# Patient Record
Sex: FEMALE | Race: WHITE | Employment: OTHER | URBAN - METROPOLITAN AREA
[De-identification: names, ages, dates, MRNs, and addresses within clinical notes are randomized per-mention and may not be internally consistent; named-entity substitution may affect disease eponyms.]

---

## 2018-05-06 ENCOUNTER — HOSPITAL ENCOUNTER (OUTPATIENT)
Age: 58
Discharge: HOME | End: 2018-05-06
Attending: FAMILY MEDICINE
Payer: COMMERCIAL

## 2018-05-06 VITALS
SYSTOLIC BLOOD PRESSURE: 117 MMHG | RESPIRATION RATE: 18 BRPM | TEMPERATURE: 98 F | WEIGHT: 160 LBS | DIASTOLIC BLOOD PRESSURE: 90 MMHG | OXYGEN SATURATION: 100 % | BODY MASS INDEX: 24 KG/M2 | HEART RATE: 82 BPM

## 2018-05-06 DIAGNOSIS — N30.01 ACUTE CYSTITIS WITH HEMATURIA: Primary | ICD-10-CM

## 2018-05-06 PROCEDURE — 87147 CULTURE TYPE IMMUNOLOGIC: CPT | Performed by: FAMILY MEDICINE

## 2018-05-06 PROCEDURE — 99214 OFFICE O/P EST MOD 30 MIN: CPT

## 2018-05-06 PROCEDURE — 81002 URINALYSIS NONAUTO W/O SCOPE: CPT | Performed by: FAMILY MEDICINE

## 2018-05-06 PROCEDURE — 87086 URINE CULTURE/COLONY COUNT: CPT | Performed by: FAMILY MEDICINE

## 2018-05-06 RX ORDER — DIPHENHYDRAMINE HCL 25 MG
25 TABLET ORAL EVERY 6 HOURS PRN
COMMUNITY
End: 2019-02-12

## 2018-05-06 RX ORDER — SULFAMETHOXAZOLE AND TRIMETHOPRIM 800; 160 MG/1; MG/1
1 TABLET ORAL 2 TIMES DAILY
Qty: 6 TABLET | Refills: 0 | Status: SHIPPED | OUTPATIENT
Start: 2018-05-06 | End: 2018-05-09

## 2018-05-06 NOTE — ED PROVIDER NOTES
Patient Seen in: [facility]    History   Patient presents with:  Urinary Symptoms (urologic)    Stated Complaint: possible UTI X3 days    HPI    x 3  days of urinary frequency and urinary urgency with suprapubic pressure. Temporal [05/06/18 1240]  SpO2: 100 % [05/06/18 1238]  O2 Device: None (Room air) [05/06/18 1238]    Current:/90   Pulse 82   Temp (!) 97.5 °F (36.4 °C) (Temporal)   Resp 18   Wt 72.6 kg   SpO2 100%   BMI 23.63 kg/m²         Physical Exam    Gen: NAD

## 2018-05-06 NOTE — ED INITIAL ASSESSMENT (HPI)
Pt c/o urinary s/s x 3 days including pressure to bladder on urination, frequency and urgency. Denies back /flank pain, no nausea, no vomiting, no hematuria.

## 2018-05-10 PROCEDURE — 87086 URINE CULTURE/COLONY COUNT: CPT | Performed by: FAMILY MEDICINE

## 2018-06-13 ENCOUNTER — HOSPITAL ENCOUNTER (EMERGENCY)
Facility: HOSPITAL | Age: 58
Discharge: HOME | End: 2018-06-13
Attending: EMERGENCY MEDICINE
Payer: COMMERCIAL

## 2018-06-13 ENCOUNTER — APPOINTMENT (OUTPATIENT)
Dept: CT IMAGING | Facility: HOSPITAL | Age: 58
End: 2018-06-13
Attending: NURSE PRACTITIONER
Payer: COMMERCIAL

## 2018-06-13 VITALS
TEMPERATURE: 98 F | SYSTOLIC BLOOD PRESSURE: 138 MMHG | HEART RATE: 74 BPM | RESPIRATION RATE: 18 BRPM | HEIGHT: 69 IN | BODY MASS INDEX: 23.7 KG/M2 | DIASTOLIC BLOOD PRESSURE: 74 MMHG | OXYGEN SATURATION: 100 % | WEIGHT: 160 LBS

## 2018-06-13 DIAGNOSIS — S16.1XXA STRAIN OF NECK MUSCLE, INITIAL ENCOUNTER: Primary | ICD-10-CM

## 2018-06-13 PROCEDURE — 96372 THER/PROPH/DIAG INJ SC/IM: CPT

## 2018-06-13 PROCEDURE — 72125 CT NECK SPINE W/O DYE: CPT | Performed by: NURSE PRACTITIONER

## 2018-06-13 PROCEDURE — 99284 EMERGENCY DEPT VISIT MOD MDM: CPT

## 2018-06-13 RX ORDER — CYCLOBENZAPRINE HCL 10 MG
10 TABLET ORAL 3 TIMES DAILY PRN
Qty: 20 TABLET | Refills: 0 | Status: SHIPPED | OUTPATIENT
Start: 2018-06-13 | End: 2018-06-20

## 2018-06-13 RX ORDER — MORPHINE SULFATE 4 MG/ML
4 INJECTION, SOLUTION INTRAMUSCULAR; INTRAVENOUS ONCE
Status: COMPLETED | OUTPATIENT
Start: 2018-06-13 | End: 2018-06-13

## 2018-06-13 RX ORDER — DIAZEPAM 5 MG/1
5 TABLET ORAL ONCE
Status: COMPLETED | OUTPATIENT
Start: 2018-06-13 | End: 2018-06-13
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
## 2019-09-03 ENCOUNTER — HOSPITAL ENCOUNTER (OUTPATIENT)
Facility: HOSPITAL | Age: 59
Setting detail: OBSERVATION
Discharge: HOME | End: 2019-09-04
Attending: EMERGENCY MEDICINE | Admitting: HOSPITALIST
Payer: COMMERCIAL

## 2019-09-03 ENCOUNTER — APPOINTMENT (OUTPATIENT)
Dept: CT IMAGING | Facility: HOSPITAL | Age: 59
End: 2019-09-03
Attending: EMERGENCY MEDICINE
Payer: COMMERCIAL

## 2019-09-03 DIAGNOSIS — R29.898 LEFT ARM WEAKNESS: Primary | ICD-10-CM

## 2019-09-03 LAB
ALBUMIN SERPL-MCNC: 3.8 G/DL (ref 3.4–5)
ALBUMIN/GLOB SERPL: 1.2 {RATIO} (ref 1–2)
ALP LIVER SERPL-CCNC: 123 U/L (ref 46–118)
ALT SERPL-CCNC: 24 U/L (ref 13–56)
ANION GAP SERPL CALC-SCNC: 5 MMOL/L (ref 0–18)
APTT PPP: 26.6 SECONDS (ref 25.4–36.1)
AST SERPL-CCNC: 24 U/L (ref 15–37)
ATRIAL RATE: 65 BPM
BASOPHILS # BLD AUTO: 0.02 X10(3) UL (ref 0–0.2)
BASOPHILS NFR BLD AUTO: 0.4 %
BILIRUB SERPL-MCNC: 0.2 MG/DL (ref 0.1–2)
BUN BLD-MCNC: 12 MG/DL (ref 7–18)
BUN/CREAT SERPL: 15.8 (ref 10–20)
CALCIUM BLD-MCNC: 9.1 MG/DL (ref 8.5–10.1)
CHLORIDE SERPL-SCNC: 104 MMOL/L (ref 98–112)
CO2 SERPL-SCNC: 31 MMOL/L (ref 21–32)
CREAT BLD-MCNC: 0.76 MG/DL (ref 0.55–1.02)
DEPRECATED RDW RBC AUTO: 45.1 FL (ref 35.1–46.3)
EOSINOPHIL # BLD AUTO: 0.49 X10(3) UL (ref 0–0.7)
EOSINOPHIL NFR BLD AUTO: 9.6 %
ERYTHROCYTE [DISTWIDTH] IN BLOOD BY AUTOMATED COUNT: 12.6 % (ref 11–15)
GLOBULIN PLAS-MCNC: 3.2 G/DL (ref 2.8–4.4)
GLUCOSE BLD-MCNC: 91 MG/DL (ref 70–99)
HCT VFR BLD AUTO: 38.1 % (ref 35–48)
HGB BLD-MCNC: 12.3 G/DL (ref 12–16)
IMM GRANULOCYTES # BLD AUTO: 0.01 X10(3) UL (ref 0–1)
IMM GRANULOCYTES NFR BLD: 0.2 %
INR BLD: 0.94 (ref 0.9–1.1)
LYMPHOCYTES # BLD AUTO: 1.51 X10(3) UL (ref 1–4)
LYMPHOCYTES NFR BLD AUTO: 29.5 %
M PROTEIN MFR SERPL ELPH: 7 G/DL (ref 6.4–8.2)
MCH RBC QN AUTO: 31 PG (ref 26–34)
MCHC RBC AUTO-ENTMCNC: 32.3 G/DL (ref 31–37)
MCV RBC AUTO: 96 FL (ref 80–100)
MONOCYTES # BLD AUTO: 0.66 X10(3) UL (ref 0.1–1)
MONOCYTES NFR BLD AUTO: 12.9 %
NEUTROPHILS # BLD AUTO: 2.43 X10 (3) UL (ref 1.5–7.7)
NEUTROPHILS # BLD AUTO: 2.43 X10(3) UL (ref 1.5–7.7)
NEUTROPHILS NFR BLD AUTO: 47.4 %
OSMOLALITY SERPL CALC.SUM OF ELEC: 289 MOSM/KG (ref 275–295)
P AXIS: 37 DEGREES
P-R INTERVAL: 134 MS
PLATELET # BLD AUTO: 220 10(3)UL (ref 150–450)
POTASSIUM SERPL-SCNC: 3.7 MMOL/L (ref 3.5–5.1)
PSA SERPL DL<=0.01 NG/ML-MCNC: 12.9 SECONDS (ref 12.5–14.7)
Q-T INTERVAL: 432 MS
QRS DURATION: 80 MS
QTC CALCULATION (BEZET): 449 MS
R AXIS: 47 DEGREES
RBC # BLD AUTO: 3.97 X10(6)UL (ref 3.8–5.3)
SODIUM SERPL-SCNC: 140 MMOL/L (ref 136–145)
T AXIS: 62 DEGREES
VENTRICULAR RATE: 65 BPM
WBC # BLD AUTO: 5.1 X10(3) UL (ref 4–11)

## 2019-09-03 PROCEDURE — 70450 CT HEAD/BRAIN W/O DYE: CPT | Performed by: EMERGENCY MEDICINE

## 2019-09-03 PROCEDURE — 99243 OFF/OP CNSLTJ NEW/EST LOW 30: CPT | Performed by: OTHER

## 2019-09-03 RX ORDER — DOCUSATE SODIUM 100 MG/1
100 CAPSULE, LIQUID FILLED ORAL 2 TIMES DAILY
Status: DISCONTINUED | OUTPATIENT
Start: 2019-09-03 | End: 2019-09-04

## 2019-09-03 RX ORDER — ACETAMINOPHEN 325 MG/1
650 TABLET ORAL EVERY 6 HOURS PRN
Status: DISCONTINUED | OUTPATIENT
Start: 2019-09-03 | End: 2019-09-04

## 2019-09-03 RX ORDER — BISACODYL 10 MG
10 SUPPOSITORY, RECTAL RECTAL
Status: DISCONTINUED | OUTPATIENT
Start: 2019-09-03 | End: 2019-09-04

## 2019-09-03 RX ORDER — DULOXETIN HYDROCHLORIDE 30 MG/1
30 CAPSULE, DELAYED RELEASE ORAL DAILY
COMMUNITY

## 2019-09-03 RX ORDER — CLONIDINE HYDROCHLORIDE 0.1 MG/1
0.1 TABLET ORAL 3 TIMES DAILY PRN
COMMUNITY
Start: 2019-08-28 | End: 2020-09-24 | Stop reason: ALTCHOICE

## 2019-09-03 RX ORDER — POLYETHYLENE GLYCOL 3350 17 G/17G
17 POWDER, FOR SOLUTION ORAL DAILY PRN
Status: DISCONTINUED | OUTPATIENT
Start: 2019-09-03 | End: 2019-09-04

## 2019-09-03 RX ORDER — OXYCODONE HYDROCHLORIDE 15 MG/1
15 TABLET ORAL 4 TIMES DAILY PRN
Status: DISCONTINUED | OUTPATIENT
Start: 2019-09-03 | End: 2019-09-04

## 2019-09-03 RX ORDER — OXYCODONE HYDROCHLORIDE 15 MG/1
15 TABLET ORAL 4 TIMES DAILY PRN
COMMUNITY
Start: 2019-08-30 | End: 2019-10-02

## 2019-09-03 RX ORDER — SODIUM PHOSPHATE, DIBASIC AND SODIUM PHOSPHATE, MONOBASIC 7; 19 G/133ML; G/133ML
1 ENEMA RECTAL ONCE AS NEEDED
Status: DISCONTINUED | OUTPATIENT
Start: 2019-09-03 | End: 2019-09-04

## 2019-09-03 RX ORDER — TRAZODONE HYDROCHLORIDE 100 MG/1
100 TABLET ORAL NIGHTLY PRN
Status: DISCONTINUED | OUTPATIENT
Start: 2019-09-03 | End: 2019-09-04

## 2019-09-03 RX ORDER — DULOXETIN HYDROCHLORIDE 60 MG/1
60 CAPSULE, DELAYED RELEASE ORAL DAILY
COMMUNITY

## 2019-09-03 NOTE — ED PROVIDER NOTES
Patient Seen in: [facility] Emergency Department    History   Patient presents with:  Numbness Weakness (neurologic)    Stated Complaint: weakness to left arm and hand since yesterday morning    HPI    Patient is a 73-year-old female who presents fo occasional (not with pain meds)    Drug use: No             Review of Systems    Positive for stated complaint: weakness to left arm and hand since yesterday morning  Other systems are as noted in HPI. Constitutional and vital signs reviewed.       All oth Normal   PTT, ACTIVATED - Normal   CBC WITH DIFFERENTIAL WITH PLATELET    Narrative: The following orders were created for panel order CBC WITH DIFFERENTIAL WITH PLATELET.   Procedure                               Abnormality         Status PM.  CT is unremarkable. Symptoms are unchanged. Patient agrees with plan for admission for further neuro evaluation.       Disposition and Plan     Clinical Impression:  Left arm weakness  (primary encounter diagnosis)    Disposition:  Admit  9/3/2019 12

## 2019-09-03 NOTE — CONSULTS
Neurology H&P    Gamaliel Polo Patient Status:  Observation    3/7/1960 MRN [id number]   [facility] 3NE-A Attending Gilford Guy,*   Hosp Day # 0 PCP Rickey Bowen MD     Subjective:  Gamaliel Polo is a(n) 64year old femal CHOLECYSTECTOMY N/A 3/14/2016    Performed by Savita Osorio MD at [facility] MAIN OR   •      • OTHER SURGICAL HISTORY  2006    cardioseal   • PFO CLOSURE - EXTERNAL REFERRAL     • REMOVAL GALLBLADDER         SocHx:  Social History    Tobacco Use      Smoking normal, dry  Extremities: No clubbing or cyanosis      Neurologic:   MENTAL STATUS: alert, ox3, normal attention, language and fund of knowledge.       CRANIAL NERVES II to XII: PERRLA, no ptosis or diplopia, EOM intact, facial sensation intact, strong eye symptoms limited to strength in the L hand.  Reports an allergy to ASA and she has no vascular risk factors so will hold antiplatelet for now      Plan:  1. L hand weakness  - Possibly small stroke vs cervical radculopathy  - MRI brain pending  - Can consid

## 2019-09-03 NOTE — PLAN OF CARE
NURSING ADMISSION NOTE      Patient admitted via Cart  Oriented to room. Safety precautions initiated. Bed in low position. Call light in reach. Assumed patient care at 1630. VSS. Room air. Tele, NSR.  A&Ox4. Denies pain.    NIH 0.   L arm weaknes

## 2019-09-03 NOTE — ED INITIAL ASSESSMENT (HPI)
Patient woke up on Monday and noted that she had a decrease in her coordination in her left hand/wrist with some numbness to the left thumb

## 2019-09-04 ENCOUNTER — APPOINTMENT (OUTPATIENT)
Dept: MRI IMAGING | Facility: HOSPITAL | Age: 59
End: 2019-09-04
Attending: Other
Payer: COMMERCIAL

## 2019-09-04 VITALS
BODY MASS INDEX: 23.94 KG/M2 | SYSTOLIC BLOOD PRESSURE: 113 MMHG | WEIGHT: 161.63 LBS | RESPIRATION RATE: 18 BRPM | HEART RATE: 71 BPM | TEMPERATURE: 98 F | OXYGEN SATURATION: 98 % | HEIGHT: 69 IN | DIASTOLIC BLOOD PRESSURE: 65 MMHG

## 2019-09-04 LAB
ANION GAP SERPL CALC-SCNC: 4 MMOL/L (ref 0–18)
BASOPHILS # BLD AUTO: 0.03 X10(3) UL (ref 0–0.2)
BASOPHILS NFR BLD AUTO: 0.7 %
BUN BLD-MCNC: 9 MG/DL (ref 7–18)
BUN/CREAT SERPL: 11.3 (ref 10–20)
CALCIUM BLD-MCNC: 8.9 MG/DL (ref 8.5–10.1)
CHLORIDE SERPL-SCNC: 104 MMOL/L (ref 98–112)
CO2 SERPL-SCNC: 34 MMOL/L (ref 21–32)
CREAT BLD-MCNC: 0.8 MG/DL (ref 0.55–1.02)
DEPRECATED RDW RBC AUTO: 45.5 FL (ref 35.1–46.3)
EOSINOPHIL # BLD AUTO: 0.49 X10(3) UL (ref 0–0.7)
EOSINOPHIL NFR BLD AUTO: 11.8 %
ERYTHROCYTE [DISTWIDTH] IN BLOOD BY AUTOMATED COUNT: 12.8 % (ref 11–15)
GLUCOSE BLD-MCNC: 95 MG/DL (ref 70–99)
HCT VFR BLD AUTO: 37.5 % (ref 35–48)
HGB BLD-MCNC: 11.9 G/DL (ref 12–16)
IMM GRANULOCYTES # BLD AUTO: 0.01 X10(3) UL (ref 0–1)
IMM GRANULOCYTES NFR BLD: 0.2 %
LYMPHOCYTES # BLD AUTO: 1.38 X10(3) UL (ref 1–4)
LYMPHOCYTES NFR BLD AUTO: 33.2 %
MCH RBC QN AUTO: 30.7 PG (ref 26–34)
MCHC RBC AUTO-ENTMCNC: 31.7 G/DL (ref 31–37)
MCV RBC AUTO: 96.6 FL (ref 80–100)
MONOCYTES # BLD AUTO: 0.46 X10(3) UL (ref 0.1–1)
MONOCYTES NFR BLD AUTO: 11.1 %
NEUTROPHILS # BLD AUTO: 1.79 X10 (3) UL (ref 1.5–7.7)
NEUTROPHILS # BLD AUTO: 1.79 X10(3) UL (ref 1.5–7.7)
NEUTROPHILS NFR BLD AUTO: 43 %
OSMOLALITY SERPL CALC.SUM OF ELEC: 292 MOSM/KG (ref 275–295)
PLATELET # BLD AUTO: 207 10(3)UL (ref 150–450)
POTASSIUM SERPL-SCNC: 3.7 MMOL/L (ref 3.5–5.1)
RBC # BLD AUTO: 3.88 X10(6)UL (ref 3.8–5.3)
SODIUM SERPL-SCNC: 142 MMOL/L (ref 136–145)
WBC # BLD AUTO: 4.2 X10(3) UL (ref 4–11)

## 2019-09-04 PROCEDURE — 70551 MRI BRAIN STEM W/O DYE: CPT | Performed by: OTHER

## 2019-09-04 PROCEDURE — 99226 SUBSEQUENT OBSERVATION CARE: CPT | Performed by: OTHER

## 2019-09-04 RX ORDER — POTASSIUM CHLORIDE 20 MEQ/1
40 TABLET, EXTENDED RELEASE ORAL ONCE
Status: COMPLETED | OUTPATIENT
Start: 2019-09-04 | End: 2019-09-04

## 2019-09-04 NOTE — PROGRESS NOTES
[facility] Hospitalist Progress Note                                                                   [address] [facility]  3/7/1960    SUBJECTIVE:  No new or worsening neurological sx.   L hand strength

## 2019-09-04 NOTE — PLAN OF CARE
Pt is A&0x4 Room Air  NSR- on Tele  Family at the bedside  Meds given per Mar  PRN pain med given at this time  Safety precaution maintained  MRI tonight or tomorrow  Will continue to monitor    Problem: MUSCULOSKELETAL - ADULT  Goal: Return mobility to [facility]

## 2019-09-04 NOTE — H&P
[facility] Hospitalist History and Physical      Patient presents with:  Numbness Weakness (neurologic)       PCP: Divina Cobos MD      History of Present Illness: Patient is a 64year old female with PMH sig for ho myofacial pain syndrome, ho PFO presents w Alcohol/week: 0.0 standard drinks      Comment: occasional (not with pain meds)       Fam Hx  Family History   Problem Relation Age of Onset   • Musculo-skelatal Disorder Father    • Arthritis Father         ERICKA   • Gastro-Intestinal Disorder Mother 09/04/2019    ALB 3.8 09/03/2019    ALKPHO 123 09/03/2019    BILT 0.2 09/03/2019    TP 7.0 09/03/2019    AST 24 09/03/2019    ALT 24 09/03/2019    PTT 26.6 09/03/2019    INR 0.94 09/03/2019    PTP 12.9 09/03/2019       CXR: image personally reviewed. infusion. PATIENT STATED HISTORY: (As transcribed by Technologist)  Left hand weakness. FINDINGS:  The ventricles and sulci are within normal limits. There is no midline shift or mass effect. The basal cisterns are patent.    There is no acute intracr

## 2019-09-04 NOTE — PLAN OF CARE
Problem: Patient/Family Goals  Goal: Patient/Family Long Term Goal  Description  Patient's Long Term Goal: Discharge home without weakness    Interventions:  - CT/MRI  - See additional Care Plan goals for specific interventions  Outcome: Nasima Pillai

## 2019-09-04 NOTE — PROGRESS NOTES
[id number] [facility] Progress Note    Jacek Juarez Patient Status:  Observation    3/7/1960 MRN [id number]   [facility] 3NE-A Attending Lara Fuentes,*   Hosp Day # 0 PCP Gene Centeno MD     CC: Left hand weakness chronic small vessel ischemic disease    9/3/2019 CT Brain  No acute intracranial hemorrhage or hydrocephalus  Trace chronic small vessel ischemic disease    Assessment/Plan:  · Left hand weakness: DDx CVA vs peripheral nerve entrapment vs possible cervica

## 2019-09-10 ENCOUNTER — HOSPITAL ENCOUNTER (OUTPATIENT)
Dept: MRI IMAGING | Facility: HOSPITAL | Age: 59
Discharge: HOME | End: 2019-09-10
Attending: INTERNAL MEDICINE
Payer: COMMERCIAL

## 2019-09-10 ENCOUNTER — HOSPITAL ENCOUNTER (OUTPATIENT)
Dept: ULTRASOUND IMAGING | Facility: HOSPITAL | Age: 59
Discharge: HOME | End: 2019-09-10
Attending: NURSE PRACTITIONER
Payer: COMMERCIAL

## 2019-09-10 DIAGNOSIS — R29.898 LEFT ARM WEAKNESS: ICD-10-CM

## 2019-09-10 PROCEDURE — 72141 MRI NECK SPINE W/O DYE: CPT | Performed by: INTERNAL MEDICINE

## 2019-09-10 PROCEDURE — 93880 EXTRACRANIAL BILAT STUDY: CPT | Performed by: NURSE PRACTITIONER

## 2019-09-11 PROBLEM — I65.23 CAROTID ARTERY PLAQUE, BILATERAL: Status: ACTIVE | Noted: 2019-01-01

## 2019-10-02 ENCOUNTER — OFFICE VISIT (OUTPATIENT)
Dept: NEUROLOGY | Facility: CLINIC | Age: 59
End: 2019-10-02
Payer: COMMERCIAL

## 2019-10-02 VITALS
SYSTOLIC BLOOD PRESSURE: 120 MMHG | HEART RATE: 68 BPM | BODY MASS INDEX: 24 KG/M2 | WEIGHT: 162 LBS | DIASTOLIC BLOOD PRESSURE: 74 MMHG | RESPIRATION RATE: 16 BRPM

## 2019-10-02 DIAGNOSIS — M54.12 CERVICAL RADICULOPATHY: ICD-10-CM

## 2019-10-02 DIAGNOSIS — R29.898 LEFT ARM WEAKNESS: Primary | ICD-10-CM

## 2019-10-02 PROCEDURE — 99214 OFFICE O/P EST MOD 30 MIN: CPT | Performed by: OTHER

## 2019-10-02 RX ORDER — BUPRENORPHINE AND NALOXONE 8; 2 MG/1; MG/1
1 FILM, SOLUBLE BUCCAL; SUBLINGUAL DAILY
COMMUNITY
End: 2020-09-24 | Stop reason: ALTCHOICE

## 2019-10-02 NOTE — PROGRESS NOTES
Neurology H&P    Mar Andrew Patient Status:  No patient class for patient encounter    3/7/1960 MRN [id number]   Location [address] [place] [address], [address] [place] [address], [address] [place] [address] Attending No att. providers found   Hosp Day # 0 PCP Artice Simmonds, MD MG Oral Cap DR Particles Take 60 mg by mouth daily. Disp:  Rfl:    DULoxetine HCl 30 MG Oral Cap DR Particles Take 30 mg by mouth daily. Disp:  Rfl:    Loratadine-Pseudoephedrine (CLARITIN-D 12 HOUR OR) Take 1 tablet by mouth daily.  Disp:  Rfl:    cloNIDin 2016    Performed by Chago Mcmillan MD at [facility] ENDOSCOPY   • IMPACT TOOTH REM BONY W/COMP     • LAPAROSCOPIC CHOLECYSTECTOMY N/A 3/14/2016    Performed by Richard Alberto MD at [facility] MAIN OR   •      • OTHER SURGICAL HISTORY  2006    cardioseal   • PF fasciculations, normal strength throughout in UEs and LEs    SENSORY EXAMINATION:  UE: intact to light touch, pinprick intact  LE: intact to light touch, pinprick intact    COORDINATION:  No dysmetria, or intention tremors; normal KAMRAN    REFLEXES: 2+ at bi

## 2020-09-24 PROBLEM — R29.898 LEFT ARM WEAKNESS: Status: RESOLVED | Noted: 2019-09-03 | Resolved: 2020-09-24
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
## 2024-09-29 ENCOUNTER — HOSPITAL ENCOUNTER (OUTPATIENT)
Age: 64
Discharge: HOME | End: 2024-09-29
Payer: COMMERCIAL

## 2024-09-29 ENCOUNTER — APPOINTMENT (OUTPATIENT)
Dept: GENERAL RADIOLOGY | Age: 64
End: 2024-09-29
Attending: NURSE PRACTITIONER
Payer: COMMERCIAL

## 2024-09-29 VITALS
HEART RATE: 87 BPM | TEMPERATURE: 98 F | RESPIRATION RATE: 20 BRPM | OXYGEN SATURATION: 97 % | WEIGHT: 175 LBS | SYSTOLIC BLOOD PRESSURE: 142 MMHG | DIASTOLIC BLOOD PRESSURE: 90 MMHG | BODY MASS INDEX: 26 KG/M2

## 2024-09-29 DIAGNOSIS — J40 BRONCHITIS: ICD-10-CM

## 2024-09-29 DIAGNOSIS — U07.1 COVID-19 VIRUS INFECTION: Primary | ICD-10-CM

## 2024-09-29 PROCEDURE — 99203 OFFICE O/P NEW LOW 30 MIN: CPT | Performed by: NURSE PRACTITIONER

## 2024-09-29 PROCEDURE — 71046 X-RAY EXAM CHEST 2 VIEWS: CPT | Performed by: NURSE PRACTITIONER

## 2024-09-29 RX ORDER — ALBUTEROL SULFATE 90 UG/1
2 INHALANT RESPIRATORY (INHALATION) EVERY 4 HOURS PRN
Qty: 1 EACH | Refills: 0 | Status: SHIPPED | OUTPATIENT
Start: 2024-09-29 | End: 2024-10-29

## 2024-09-29 RX ORDER — BENZONATATE 100 MG/1
100 CAPSULE ORAL 3 TIMES DAILY PRN
Qty: 30 CAPSULE | Refills: 0 | Status: SHIPPED | OUTPATIENT
Start: 2024-09-29 | End: 2024-10-29

## 2024-09-29 RX ORDER — CODEINE PHOSPHATE AND GUAIFENESIN 10; 100 MG/5ML; MG/5ML
SOLUTION ORAL NIGHTLY PRN
Qty: 50 ML | Refills: 0 | Status: SHIPPED | OUTPATIENT
Start: 2024-09-29 | End: 2024-10-04

## 2024-09-29 NOTE — DISCHARGE INSTRUCTIONS
Take medications as directed.  Increase oral fluids.  Take Cheratussin as directed and do not drive after taking for 6-8 hours.  Tylenol for pain.

## 2024-09-29 NOTE — ED PROVIDER NOTES
Patient Seen in: [facility]      History     Chief Complaint   Patient presents with    Covid-19 Test     Tested positive.  3 days ago.  Symptoms cough & low fever. - Entered by patient    Cough/URI     Stated Complaint: Covid-19 Test - Tested positive.  3 days ago.  Symptoms cough & low fever.    Subjective:   HPI  64-year-old female presents stating she thinks she has a sinus infection after testing positive for COVID 3 days ago when her symptoms started with congestion and cough.  Patient states her son and grandson have bacterial pneumonia.    Objective:   Past Medical History:    Carotid artery plaque, bilateral    mild    Depression    HEADACHES    Myofascial pain syndrome    Occlusion and stenosis of carotid artery without mention of cerebral infarction    PFO (patent foramen ovale) (HCC)    no current problems per pt, pcp monitors, hx cardioseal    SEASONAL ALLERGIES              Past Surgical History:   Procedure Laterality Date    Biopsy of thyroid,percut  2010    hyperplastic nodule    Cholecystectomy      Colonoscopy N/A 2022    Procedure: COLONOSCOPY,;  Surgeon: Rylan Osborne MD;  Location: [facility]    Ercp,remval stones  3/12/16    Impact tooth rem bony w/comp      Laparoscopic cholecystectomy N/A 3/14/2016    Procedure: LAPAROSCOPIC CHOLECYSTECTOMY;  Surgeon: Robert Rizo MD;  Location:  MAIN OR          Other surgical history  2006    cardioseal    Pfo closure - external referral      Pfo closure - external referral      Removal gallbladder                  Social History     Socioeconomic History    Marital status:     Number of children: 1    Years of education: 16   Occupational History    Occupation: Manager     Comment: [employer]   Tobacco Use    Smoking status: Former     Current packs/day: 0.00     Average packs/day: 0.5 packs/day for 2.0 years (1.0 ttl pk-yrs)     Types: Cigarettes     Start date: 1983     Quit  date: 1985     Years since quittin.7    Smokeless tobacco: Never   Vaping Use    Vaping status: Never Used   Substance and Sexual Activity    Alcohol use: Yes     Alcohol/week: 0.0 standard drinks of alcohol     Comment: occasional (not with pain meds)    Drug use: Yes     Frequency: 2.0 times per week     Types: Cannabis    Sexual activity: Yes     Partners: Male   Other Topics Concern     Service No    Blood Transfusions No    Caffeine Concern Yes     Comment: coffee 2 cups/day    Occupational Exposure No    Hobby Hazards No    Sleep Concern Yes    Stress Concern No    Weight Concern Yes     Comment: 50 # over 1 year    Special Diet No    Back Care No    Exercise Yes     Comment: Treadmill, walking    Bike Helmet No    Seat Belt Yes    Self-Exams Yes   Social History Narrative    Lives with     Enjoys photography              Review of Systems   All other systems reviewed and are negative.      Positive for stated Chief Complaint: Covid-19 Test (Tested positive.  3 days ago.  Symptoms cough & low fever. - Entered by patient) and Cough/URI    Other systems are as noted in HPI.  Constitutional and vital signs reviewed.      All other systems reviewed and negative except as noted above.    Physical Exam     ED Triage Vitals [24 0916]   /90   Pulse 87   Resp 20   Temp 97.9 °F (36.6 °C)   Temp src Temporal   SpO2 97 %   O2 Device None (Room air)       Current Vitals:   Vital Signs  BP: 142/90  Pulse: 87  Resp: 20  Temp: 97.9 °F (36.6 °C)  Temp src: Temporal    Oxygen Therapy  SpO2: 97 %  O2 Device: None (Room air)            Physical Exam  Vitals and nursing note reviewed.   Constitutional:       General: She is not in acute distress.     Appearance: She is well-developed. She is not ill-appearing or toxic-appearing.   HENT:      Right Ear: Tympanic membrane, ear canal and external ear normal.      Left Ear: Tympanic membrane, ear canal and external ear normal.      Nose: Congestion  present. No rhinorrhea.      Mouth/Throat:      Pharynx: No oropharyngeal exudate or posterior oropharyngeal erythema.   Cardiovascular:      Rate and Rhythm: Normal rate and regular rhythm.      Heart sounds: Normal heart sounds.   Pulmonary:      Effort: Pulmonary effort is normal. No respiratory distress.      Breath sounds: Normal breath sounds. No wheezing.   Skin:     General: Skin is warm and dry.   Neurological:      Mental Status: She is alert and oriented to person, place, and time.               ED Course   Labs Reviewed - No data to display        XR CHEST PA + LAT CHEST (CPT=71046)    Result Date: 9/29/2024  PROCEDURE:  XR CHEST PA + LAT CHEST (CPT=71046)  INDICATIONS:  covid +, worsening cough  COMPARISON:  None.  TECHNIQUE:  PA and lateral chest radiographs were obtained.  PATIENT STATED HISTORY: (As transcribed by Technologist)  Patient complains of cough and nasal congestion over the last 3-4 days. Patient states testing positive for covid 3 days ago. Patient denies surgeries to the chest.    FINDINGS:  There is mild bronchial wall thickening.  Cardiac silhouette is normal in size.  Lungs are mildly hyperexpanded.  No significant pleural fluid or pneumothorax.  Metallic device projects over the cardiac silhoue silhouette, likely a watchmen device.             CONCLUSION:  Mild bronchial wall thickening may represent bronchitis and/or reactive airway disease.  No lobar consolidation is seen to suggest pneumonia.   LOCATION:  [facility]   Dictated by (CST): Stromberg, LeRoy, MD on 9/29/2024 at 10:23 AM     Finalized by (CST): Stromberg, LeRoy, MD on 9/29/2024 at 10:25 AM               MDM       Medical Decision Making  64-year-old female presents stating she thinks she has a sinus infection after testing positive for COVID 3 days ago when her symptoms started with congestion and cough.  Patient states her son and grandson have bacterial pneumonia.    Pertinent Labs & Imaging studies reviewed. (See chart  for details).  Patient coming in with covid.   Differential diagnosis includes but not limited to covid, sinusitis, bronchitis, pneumonia  Radiology Mild bronchial wall thickening may represent bronchitis and/or reactive airway disease.  No lobar consolidation is seen to suggest pneumonia.  Will treat for COVID, bronchitis.  Will discharge on Tessalon, albuterol, and Cheratussin AC at bedtime. Patient/Parent is comfortable with this plan.    Overall Pt looks good. Non-toxic, well-hydrated and in no respiratory distress. Vital signs are reassuring. Exam is reassuring. I do not believe pt requires and additional diagnostic studies or intervention. I believe pt can be discharged home to continue evaluation as an outpatient. Follow-up provider given. Discharge instructions given and reviewed. Return for any problems. All understand and agree with the plan.        Problems Addressed:  Bronchitis: acute illness or injury  COVID-19 virus infection: acute illness or injury    Amount and/or Complexity of Data Reviewed  Radiology: ordered and independent interpretation performed.     Details: Chest x-ray ordered and independently interpreted by myself as no consolidation        Disposition and Plan     Clinical Impression:  1. COVID-19 virus infection    2. Bronchitis         Disposition:  Discharge  9/29/2024 10:29 am    Follow-up:  No follow-up provider specified.        Medications Prescribed:  Discharge Medication List as of 9/29/2024 10:33 AM        START taking these medications    Details   albuterol 108 (90 Base) MCG/ACT Inhalation Aero Soln Inhale 2 puffs into the lungs every 4 (four) hours as needed for Wheezing., Normal, Disp-1 each, R-0      benzonatate 100 MG Oral Cap Take 1 capsule (100 mg total) by mouth 3 (three) times daily as needed for cough., Normal, Disp-30 capsule, R-0

## 2024-09-29 NOTE — ED INITIAL ASSESSMENT (HPI)
Pt has tested positive for covid, and her cough and congestion is getting worse.  She had  fever yesterday.  The cough is worse, and she has a PMH of sinus infection

## (undated) NOTE — ED AVS SNAPSHOT
April Tillman   MRN: [id number]    Department:  [facility] Emergency Department   Date of Visit:  6/13/2018           Disclosure     Insurance plans vary and the physician(s) referred by the ER may not be covered by your plan.  Please contact your tell this physician (or your personal doctor if your instructions are to return to your personal doctor) about any new or lasting problems. The primary care or specialist physician will see patients referred from the [facility] Emergency Department.  Jin Nassar